# Patient Record
(demographics unavailable — no encounter records)

---

## 2024-12-24 NOTE — HISTORY OF PRESENT ILLNESS
[de-identified] : Cardiologist: Dr. Carbajal  now Dr. Du EP: Prev Dr. Reis/ Dr. Pickard  74 yo M with history of CAD with MI (2002) treated medically, event monitor with NSVT and PVCS with cath s/p 4 stents (last one in Aug 2018), ICM s/p SC ICD (12/2018), HTN .  Pt had new onset AFib on Biotel MCOT on 10/26/20.   He presents today for routine follow up visit. He denies chest pain, dyspnea, palpitations, dizziness, lightheadedness, presyncope or syncope.

## 2024-12-24 NOTE — PROCEDURE
[No] : not [NSR] : normal sinus rhythm [See Scanned Paceart Report] : See scanned paceart report [See Device Printout] : See device printout [ICD] : Implantable cardioverter-defibrillator [VVI] : VVI [Longevity: ___ months] : The estimated remaining battery life is [unfilled] months [Threshold Testing Performed] : Threshold testing was performed [Lead Imp:  ___ohms] : lead impedance was [unfilled] ohms [Sensing Amplitude ___mv] : sensing amplitude was [unfilled] mv [___V @] : [unfilled] V [___ ms] : [unfilled] ms [Programmed for Longevity] : output reprogrammed for improved battery longevity [Pace ___ %] : Pace [unfilled]% [de-identified] : [place] [de-identified] : 1411-36Q [de-identified] : [id number] [de-identified] : 12/04/2018 [de-identified] : 40 [de-identified] : Multiple "NSVT" episodes that are WCT lasting 12- 20  seconds (NSVT vs SVT with aberrancy or atrial fib with RVR. Patient asymptomatic ( see lists) no therapy  CorVue stable Patient does not want a home monitor.

## 2024-12-24 NOTE — PROCEDURE
[No] : not [NSR] : normal sinus rhythm [See Scanned Paceart Report] : See scanned paceart report [See Device Printout] : See device printout [ICD] : Implantable cardioverter-defibrillator [VVI] : VVI [Longevity: ___ months] : The estimated remaining battery life is [unfilled] months [Threshold Testing Performed] : Threshold testing was performed [Lead Imp:  ___ohms] : lead impedance was [unfilled] ohms [Sensing Amplitude ___mv] : sensing amplitude was [unfilled] mv [___V @] : [unfilled] V [___ ms] : [unfilled] ms [Programmed for Longevity] : output reprogrammed for improved battery longevity [Pace ___ %] : Pace [unfilled]% [de-identified] : [place] [de-identified] : 1411-36Q [de-identified] : [id number] [de-identified] : 12/04/2018 [de-identified] : 40 [de-identified] : Multiple "NSVT" episodes that are WCT lasting 12- 20  seconds (NSVT vs SVT with aberrancy or atrial fib with RVR. Patient asymptomatic ( see lists) no therapy  CorVue stable Patient does not want a home monitor.

## 2024-12-24 NOTE — CARDIOLOGY SUMMARY
[de-identified] : 6/21/2023 NSR 71bpm with occ pvc\par  5/23/2022 NSR (HR 71 bpm), PVC , IVCD\par  10/26/2022  sinus taylor at 51bpm [de-identified] : 10/19/20-11/17/20 2% AFib with RVR. \par   [de-identified] : 8/12/2021 EF 35%. Infarction in the distribution of proximal to mid RCA with mild ischemia in the distribution of what appears to be a first OM. Severe hypokinesis akinesis and decreased thickening of the inferior wall with mild global HK with EF 35%. Frequent PVCs. \par   [de-identified] : 8/10/23 LVEF 41% , moderately dilated LA 1/23/20, Mod LAE LVEF 40%.   [de-identified] : 12/4/2018 - Abbott single chamber ICD [de-identified] : 8/20/18 PCI to 80% lesion in prox LAD.

## 2024-12-24 NOTE — PHYSICAL EXAM
[General Appearance - Well Developed] : well developed [Normal Appearance] : normal appearance [Well Groomed] : well groomed [General Appearance - Well Nourished] : well nourished [No Deformities] : no deformities [General Appearance - In No Acute Distress] : no acute distress [Heart Rate And Rhythm] : heart rate and rhythm were normal [Heart Sounds] : normal S1 and S2 [Murmurs] : no murmurs present [Edema] : no peripheral edema present [] : no respiratory distress [Respiration, Rhythm And Depth] : normal respiratory rhythm and effort [Exaggerated Use Of Accessory Muscles For Inspiration] : no accessory muscle use [Auscultation Breath Sounds / Voice Sounds] : lungs were clear to auscultation bilaterally [Left Infraclavicular] : left infraclavicular area [Clean] : clean [Dry] : dry [Well-Healed] : well-healed [Abdomen Soft] : soft [Nail Clubbing] : no clubbing of the fingernails [Erythema] : not erythematous

## 2024-12-24 NOTE — HISTORY OF PRESENT ILLNESS
[de-identified] : Cardiologist: Dr. Carbajal  now Dr. Du EP: Prev Dr. Reis/ Dr. Pickard  74 yo M with history of CAD with MI (2002) treated medically, event monitor with NSVT and PVCS with cath s/p 4 stents (last one in Aug 2018), ICM s/p SC ICD (12/2018), HTN .  Pt had new onset AFib on Biotel MCOT on 10/26/20.   He presents today for routine follow up visit. He denies chest pain, dyspnea, palpitations, dizziness, lightheadedness, presyncope or syncope.

## 2024-12-24 NOTE — CARDIOLOGY SUMMARY
[de-identified] : 6/21/2023 NSR 71bpm with occ pvc\par  5/23/2022 NSR (HR 71 bpm), PVC , IVCD\par  10/26/2022  sinus taylor at 51bpm [de-identified] : 10/19/20-11/17/20 2% AFib with RVR. \par   [de-identified] : 8/12/2021 EF 35%. Infarction in the distribution of proximal to mid RCA with mild ischemia in the distribution of what appears to be a first OM. Severe hypokinesis akinesis and decreased thickening of the inferior wall with mild global HK with EF 35%. Frequent PVCs. \par   [de-identified] : 8/10/23 LVEF 41% , moderately dilated LA 1/23/20, Mod LAE LVEF 40%.   [de-identified] : 12/4/2018 - Abbott single chamber ICD [de-identified] : 8/20/18 PCI to 80% lesion in prox LAD.

## 2024-12-24 NOTE — ASSESSMENT
[FreeTextEntry1] : 72 yo M with history of HTN, HL, DM, CAD s/p PCI, ICM s/p SC ICD for primary prevention with paroxysmal AFib on Eliquis here for routine interrogation of ICD.  # Paroxysmal AFib - Cont Eliquis 5mg PO BID for CHADS VASc of at least 5 (CHF, HTN, Age > 65, DM, CAD). No s/sx of bleeding. Pt denies palpitations.  # ICM s/p SC ICD - Pt is not interested in remote monitor. Interrogation as above.  - Consider adding atrial lead with next gen change.  -multiple  "NSVT," possibly AF with RVR - SVT episodes - patient is taking Metoprolol tartrate 25mg twice daily   I offered again  to increase dose to at least 50 mg twice daily for rate control. However, he continue to refused . I plan to discuss with Dr. Du . - Cont GDMT including Entresto and Metoprolol.  - CorVue stable. No clinical s/sx of heart failure.     I have also advised the patient to go to the nearest emergency room if he experiences any chest pain, dyspnea, syncope, or has any other compelling symptoms.  Follow up in 9 months

## 2025-03-04 NOTE — ASSESSMENT
[FreeTextEntry1] : 73 y/o male with history as above. Prior records reviwed.  CAD S/p PCI ICM C/w secondary prevention C/w ASA. Statin BB  PAF C/w BB C/w Pradaxa - switched from Eliquis F/u with EP  HTN C/w BP control. Low salt diet Elevated BP today - went down to 134/80 after sitting down for 5 minutes. Patient reports normal BP at home. Monitor BP, if elevated - will call me to increase Entresto.  DL C/w statin  CHF ICM No evidence of fluid overload C/w BB, Entresto.  NSVT. S/p AICD F/u with EP.  Patient was advised about healthy lifestyle changes, including diet and exercise. Importance of sustained long-term weight loss was discussed, questions answered.    Return in 6 months.

## 2025-03-04 NOTE — HISTORY OF PRESENT ILLNESS
[FreeTextEntry1] : The patient is a 75 yo M with history of CAD with MI (2002) treated medically, event monitor with NSVT and PVCS with cath s/p 4 stents (last one in Aug 2018), ICM s/p SC ICD (12/2018), HTN. Pt had new onset paroxysmal AFib on Biotel MCOT on 10/26/20. On Eliquis for CVA prevention. Patient was previously seen by Dr. Carbajal, presents for f/u of his chronic problems. No chest pain or dyspnea. He reports good BP control. No palpitations. Tolerating AC. Switched to Pradaxa due to coverage. Labs noted.

## 2025-03-04 NOTE — PHYSICAL EXAM
[Well Developed] : well developed [Well Nourished] : well nourished [No Acute Distress] : no acute distress [Normal Conjunctiva] : normal conjunctiva [Normal Venous Pressure] : normal venous pressure [No Carotid Bruit] : no carotid bruit [Normal S1, S2] : normal S1, S2 [No Rub] : no rub [No Gallop] : no gallop [Clear Lung Fields] : clear lung fields [Good Air Entry] : good air entry [No Respiratory Distress] : no respiratory distress  [Soft] : abdomen soft [Non Tender] : non-tender [No Masses/organomegaly] : no masses/organomegaly [Normal Bowel Sounds] : normal bowel sounds [Normal Gait] : normal gait [No Edema] : no edema [No Cyanosis] : no cyanosis [No Clubbing] : no clubbing [No Varicosities] : no varicosities [No Rash] : no rash [No Skin Lesions] : no skin lesions [Moves all extremities] : moves all extremities [No Focal Deficits] : no focal deficits [Normal Speech] : normal speech [Alert and Oriented] : alert and oriented [Normal memory] : normal memory [de-identified] : 2/6 SM